# Patient Record
Sex: FEMALE | Race: WHITE | NOT HISPANIC OR LATINO | Employment: UNEMPLOYED | URBAN - METROPOLITAN AREA
[De-identification: names, ages, dates, MRNs, and addresses within clinical notes are randomized per-mention and may not be internally consistent; named-entity substitution may affect disease eponyms.]

---

## 2024-01-28 ENCOUNTER — OFFICE VISIT (OUTPATIENT)
Dept: URGENT CARE | Facility: CLINIC | Age: 64
End: 2024-01-28
Payer: COMMERCIAL

## 2024-01-28 ENCOUNTER — HOSPITAL ENCOUNTER (EMERGENCY)
Facility: HOSPITAL | Age: 64
Discharge: HOME | End: 2024-01-28
Attending: STUDENT IN AN ORGANIZED HEALTH CARE EDUCATION/TRAINING PROGRAM
Payer: COMMERCIAL

## 2024-01-28 ENCOUNTER — APPOINTMENT (EMERGENCY)
Dept: CT IMAGING | Facility: HOSPITAL | Age: 64
End: 2024-01-28
Payer: COMMERCIAL

## 2024-01-28 ENCOUNTER — APPOINTMENT (OUTPATIENT)
Dept: RADIOLOGY | Facility: CLINIC | Age: 64
End: 2024-01-28
Payer: COMMERCIAL

## 2024-01-28 VITALS
RESPIRATION RATE: 20 BRPM | HEIGHT: 67 IN | HEART RATE: 85 BPM | BODY MASS INDEX: 24.96 KG/M2 | WEIGHT: 159 LBS | SYSTOLIC BLOOD PRESSURE: 171 MMHG | DIASTOLIC BLOOD PRESSURE: 77 MMHG | OXYGEN SATURATION: 97 % | TEMPERATURE: 98 F

## 2024-01-28 VITALS
OXYGEN SATURATION: 99 % | HEART RATE: 77 BPM | DIASTOLIC BLOOD PRESSURE: 70 MMHG | SYSTOLIC BLOOD PRESSURE: 166 MMHG | RESPIRATION RATE: 18 BRPM | TEMPERATURE: 97.9 F
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
## 2024-01-28 DIAGNOSIS — J32.9 SINUSITIS: ICD-10-CM

## 2024-01-28 DIAGNOSIS — Z23 ENCOUNTER FOR IMMUNIZATION: ICD-10-CM

## 2024-01-28 DIAGNOSIS — R07.89 CHEST WALL PAIN: ICD-10-CM

## 2024-01-28 DIAGNOSIS — E83.42 HYPOMAGNESEMIA: ICD-10-CM

## 2024-01-28 DIAGNOSIS — S00.81XA ABRASION OF FACE, INITIAL ENCOUNTER: ICD-10-CM

## 2024-01-28 DIAGNOSIS — R11.0 NAUSEA: ICD-10-CM

## 2024-01-28 DIAGNOSIS — S63.502A SPRAIN OF LEFT WRIST, INITIAL ENCOUNTER: ICD-10-CM
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
## 2024-01-28 DIAGNOSIS — M54.9 BACK PAIN: ICD-10-CM

## 2024-01-28 LAB
2HR DELTA HS TROPONIN: 3 NG/L
ALBUMIN SERPL BCP-MCNC: 4.4 G/DL (ref 3.5–5)
ALP SERPL-CCNC: 91 U/L (ref 34–104)
ALT SERPL W P-5'-P-CCNC: 28 U/L (ref 7–52)
ANION GAP SERPL CALCULATED.3IONS-SCNC: 8 MMOL/L
AST SERPL W P-5'-P-CCNC: 28 U/L (ref 13–39)
BACTERIA UR QL AUTO: NORMAL /HPF
BASOPHILS # BLD AUTO: 0.03 THOUSANDS/ÂΜL (ref 0–0.1)
BASOPHILS NFR BLD AUTO: 0 % (ref 0–1)
BILIRUB SERPL-MCNC: 0.72 MG/DL (ref 0.2–1)
BILIRUB UR QL STRIP: NEGATIVE
BUN SERPL-MCNC: 8 MG/DL (ref 5–25)
CALCIUM SERPL-MCNC: 9.7 MG/DL (ref 8.4–10.2)
CARDIAC TROPONIN I PNL SERPL HS: 11 NG/L
CARDIAC TROPONIN I PNL SERPL HS: 8 NG/L
CHLORIDE SERPL-SCNC: 101 MMOL/L (ref 96–108)
CLARITY UR: CLEAR
CO2 SERPL-SCNC: 28 MMOL/L (ref 21–32)
COLOR UR: YELLOW
CREAT SERPL-MCNC: 0.46 MG/DL (ref 0.6–1.3)
EOSINOPHIL # BLD AUTO: 0.07 THOUSAND/ÂΜL (ref 0–0.61)
EOSINOPHIL NFR BLD AUTO: 1 % (ref 0–6)
ERYTHROCYTE [DISTWIDTH] IN BLOOD BY AUTOMATED COUNT: 13.2 % (ref 11.6–15.1)
FLUAV RNA RESP QL NAA+PROBE: NEGATIVE
FLUBV RNA RESP QL NAA+PROBE: NEGATIVE
GFR SERPL CREATININE-BSD FRML MDRD: 105 ML/MIN/1.73SQ M
GLUCOSE SERPL-MCNC: 161 MG/DL (ref 65–140)
GLUCOSE UR STRIP-MCNC: NEGATIVE MG/DL
HCT VFR BLD AUTO: 39.4 % (ref 34.8–46.1)
HGB BLD-MCNC: 13.5 G/DL (ref 11.5–15.4)
HGB UR QL STRIP.AUTO: ABNORMAL
IMM GRANULOCYTES # BLD AUTO: 0.1 THOUSAND/UL (ref 0–0.2)
IMM GRANULOCYTES NFR BLD AUTO: 1 % (ref 0–2)
KETONES UR STRIP-MCNC: NEGATIVE MG/DL
LACTATE SERPL-SCNC: 1.7 MMOL/L (ref 0.5–2)
LEUKOCYTE ESTERASE UR QL STRIP: NEGATIVE
LIPASE SERPL-CCNC: 25 U/L (ref 11–82)
LYMPHOCYTES # BLD AUTO: 1.57 THOUSANDS/ÂΜL (ref 0.6–4.47)
LYMPHOCYTES NFR BLD AUTO: 11 % (ref 14–44)
MAGNESIUM SERPL-MCNC: 1.7 MG/DL (ref 1.9–2.7)
MCH RBC QN AUTO: 29.7 PG (ref 26.8–34.3)
MCHC RBC AUTO-ENTMCNC: 34.3 G/DL (ref 31.4–37.4)
MCV RBC AUTO: 87 FL (ref 82–98)
MONOCYTES # BLD AUTO: 0.62 THOUSAND/ÂΜL (ref 0.17–1.22)
MONOCYTES NFR BLD AUTO: 4 % (ref 4–12)
NEUTROPHILS # BLD AUTO: 12.23 THOUSANDS/ÂΜL (ref 1.85–7.62)
NEUTS SEG NFR BLD AUTO: 83 % (ref 43–75)
NITRITE UR QL STRIP: NEGATIVE
NON-SQ EPI CELLS URNS QL MICRO: NORMAL /HPF
NRBC BLD AUTO-RTO: 0 /100 WBCS
PH UR STRIP.AUTO: 7 [PH]
PLATELET # BLD AUTO: 289 THOUSANDS/UL (ref 149–390)
PMV BLD AUTO: 8.9 FL (ref 8.9–12.7)
POTASSIUM SERPL-SCNC: 4.4 MMOL/L (ref 3.5–5.3)
PROT SERPL-MCNC: 7.2 G/DL (ref 6.4–8.4)
PROT UR STRIP-MCNC: NEGATIVE MG/DL
RBC # BLD AUTO: 4.54 MILLION/UL (ref 3.81–5.12)
RBC #/AREA URNS AUTO: NORMAL /HPF
RSV RNA RESP QL NAA+PROBE: NEGATIVE
SARS-COV-2 RNA RESP QL NAA+PROBE: NEGATIVE
SODIUM SERPL-SCNC: 137 MMOL/L (ref 135–147)
SP GR UR STRIP.AUTO: <=1.005 (ref 1–1.03)
UROBILINOGEN UR QL STRIP.AUTO: 0.2 E.U./DL
WBC # BLD AUTO: 14.62 THOUSAND/UL (ref 4.31–10.16)
WBC #/AREA URNS AUTO: NORMAL /HPF

## 2024-01-28 PROCEDURE — 84484 ASSAY OF TROPONIN QUANT: CPT | Performed by: PHYSICIAN ASSISTANT

## 2024-01-28 PROCEDURE — 99285 EMERGENCY DEPT VISIT HI MDM: CPT | Performed by: PHYSICIAN ASSISTANT

## 2024-01-28 PROCEDURE — 70486 CT MAXILLOFACIAL W/O DYE: CPT

## 2024-01-28 PROCEDURE — 85025 COMPLETE CBC W/AUTO DIFF WBC: CPT | Performed by: PHYSICIAN ASSISTANT

## 2024-01-28 PROCEDURE — G1004 CDSM NDSC: HCPCS

## 2024-01-28 PROCEDURE — 71101 X-RAY EXAM UNILAT RIBS/CHEST: CPT

## 2024-01-28 PROCEDURE — 83605 ASSAY OF LACTIC ACID: CPT | Performed by: PHYSICIAN ASSISTANT

## 2024-01-28 PROCEDURE — 80053 COMPREHEN METABOLIC PANEL: CPT | Performed by: PHYSICIAN ASSISTANT

## 2024-01-28 PROCEDURE — 70450 CT HEAD/BRAIN W/O DYE: CPT

## 2024-01-28 PROCEDURE — 71260 CT THORAX DX C+: CPT

## 2024-01-28 PROCEDURE — 96361 HYDRATE IV INFUSION ADD-ON: CPT

## 2024-01-28 PROCEDURE — 99213 OFFICE O/P EST LOW 20 MIN: CPT

## 2024-01-28 PROCEDURE — 72125 CT NECK SPINE W/O DYE: CPT

## 2024-01-28 PROCEDURE — 0241U HB NFCT DS VIR RESP RNA 4 TRGT: CPT | Performed by: PHYSICIAN ASSISTANT

## 2024-01-28 PROCEDURE — 90471 IMMUNIZATION ADMIN: CPT

## 2024-01-28 PROCEDURE — 74177 CT ABD & PELVIS W/CONTRAST: CPT

## 2024-01-28 PROCEDURE — 90715 TDAP VACCINE 7 YRS/> IM: CPT

## 2024-01-28 PROCEDURE — 81001 URINALYSIS AUTO W/SCOPE: CPT | Performed by: PHYSICIAN ASSISTANT

## 2024-01-28 PROCEDURE — 96365 THER/PROPH/DIAG IV INF INIT: CPT

## 2024-01-28 PROCEDURE — 87040 BLOOD CULTURE FOR BACTERIA: CPT | Performed by: PHYSICIAN ASSISTANT

## 2024-01-28 PROCEDURE — 70150 X-RAY EXAM OF FACIAL BONES: CPT

## 2024-01-28 PROCEDURE — 99284 EMERGENCY DEPT VISIT MOD MDM: CPT

## 2024-01-28 PROCEDURE — 73110 X-RAY EXAM OF WRIST: CPT

## 2024-01-28 PROCEDURE — 83735 ASSAY OF MAGNESIUM: CPT | Performed by: PHYSICIAN ASSISTANT

## 2024-01-28 PROCEDURE — 36415 COLL VENOUS BLD VENIPUNCTURE: CPT | Performed by: PHYSICIAN ASSISTANT

## 2024-01-28 PROCEDURE — 83690 ASSAY OF LIPASE: CPT | Performed by: PHYSICIAN ASSISTANT

## 2024-01-28 PROCEDURE — 96375 TX/PRO/DX INJ NEW DRUG ADDON: CPT

## 2024-01-28 PROCEDURE — 93005 ELECTROCARDIOGRAM TRACING: CPT

## 2024-01-28 RX ORDER — ATENOLOL 50 MG/1
TABLET ORAL
COMMUNITY
Start: 2023-10-16

## 2024-01-28 RX ORDER — ITRACONAZOLE 100 MG/1
CAPSULE ORAL
COMMUNITY
Start: 2023-09-20

## 2024-01-28 RX ORDER — GLIMEPIRIDE 2 MG/1
2 TABLET ORAL
COMMUNITY
Start: 2023-10-13

## 2024-01-28 RX ORDER — MORPHINE SULFATE 4 MG/ML
4 INJECTION, SOLUTION INTRAMUSCULAR; INTRAVENOUS ONCE
Status: COMPLETED | OUTPATIENT
Start: 2024-01-28 | End: 2024-01-28

## 2024-01-28 RX ORDER — MONTELUKAST SODIUM 4 MG/1
TABLET, CHEWABLE ORAL
COMMUNITY
Start: 2023-11-17

## 2024-01-28 RX ORDER — LATANOPROST 50 UG/ML
SOLUTION/ DROPS OPHTHALMIC
COMMUNITY
Start: 2023-11-22

## 2024-01-28 RX ORDER — UREA 10 %
500 LOTION (ML) TOPICAL 2 TIMES DAILY
Qty: 14 TABLET | Refills: 0 | Status: SHIPPED | OUTPATIENT
Start: 2024-01-28 | End: 2024-02-06

## 2024-01-28 RX ORDER — ONDANSETRON 4 MG/1
4 TABLET, ORALLY DISINTEGRATING ORAL ONCE
Status: COMPLETED | OUTPATIENT
Start: 2024-01-28 | End: 2024-01-28

## 2024-01-28 RX ORDER — MELOXICAM 15 MG/1
TABLET ORAL
COMMUNITY

## 2024-01-28 RX ORDER — PAROXETINE HYDROCHLORIDE 40 MG/1
TABLET, FILM COATED ORAL
COMMUNITY

## 2024-01-28 RX ORDER — MAGNESIUM SULFATE 1 G/100ML
1 INJECTION INTRAVENOUS ONCE
Status: COMPLETED | OUTPATIENT
Start: 2024-01-28 | End: 2024-01-28

## 2024-01-28 RX ORDER — LEVOTHYROXINE SODIUM 175 MCG
TABLET ORAL
COMMUNITY
Start: 2023-09-18

## 2024-01-28 RX ORDER — PIOGLITAZONEHYDROCHLORIDE 15 MG/1
15 TABLET ORAL DAILY
COMMUNITY

## 2024-01-28 RX ORDER — AMOXICILLIN AND CLAVULANATE POTASSIUM 875; 125 MG/1; MG/1
1 TABLET, FILM COATED ORAL ONCE
Status: COMPLETED | OUTPATIENT
Start: 2024-01-28 | End: 2024-01-28

## 2024-01-28 RX ORDER — KETOCONAZOLE 20 MG/G
1 CREAM TOPICAL DAILY
COMMUNITY
Start: 2023-09-20 | End: 2024-09-19

## 2024-01-28 RX ORDER — ESTRADIOL 10 UG/1
INSERT VAGINAL
COMMUNITY
Start: 2023-10-02

## 2024-01-28 RX ORDER — AMOXICILLIN AND CLAVULANATE POTASSIUM 875; 125 MG/1; MG/1
1 TABLET, FILM COATED ORAL EVERY 12 HOURS
Qty: 14 TABLET | Refills: 0 | Status: SHIPPED | OUTPATIENT
Start: 2024-01-28 | End: 2024-02-04

## 2024-01-28 RX ORDER — METHOCARBAMOL 500 MG/1
TABLET, FILM COATED ORAL
COMMUNITY
Start: 2024-01-23

## 2024-01-28 RX ORDER — GLIMEPIRIDE 2 MG/1
1 TABLET ORAL DAILY
COMMUNITY

## 2024-01-28 RX ORDER — EPINEPHRINE 0.3 MG/.3ML
0.3 INJECTION SUBCUTANEOUS
COMMUNITY

## 2024-01-28 RX ORDER — GABAPENTIN 100 MG/1
CAPSULE ORAL
COMMUNITY
Start: 2024-01-23

## 2024-01-28 RX ORDER — AMLODIPINE BESYLATE 5 MG/1
TABLET ORAL
COMMUNITY
Start: 2023-10-16

## 2024-01-28 RX ORDER — ONDANSETRON 4 MG/1
4 TABLET, ORALLY DISINTEGRATING ORAL EVERY 6 HOURS PRN
Qty: 20 TABLET | Refills: 0 | Status: SHIPPED | OUTPATIENT
Start: 2024-01-28

## 2024-01-28 RX ADMIN — ONDANSETRON 4 MG: 4 TABLET, ORALLY DISINTEGRATING ORAL at 14:15

## 2024-01-28 RX ADMIN — MORPHINE SULFATE 4 MG: 4 INJECTION INTRAVENOUS at 15:47

## 2024-01-28 RX ADMIN — IOHEXOL 100 ML: 350 INJECTION, SOLUTION INTRAVENOUS at 16:52

## 2024-01-28 RX ADMIN — SODIUM CHLORIDE 500 ML: 0.9 INJECTION, SOLUTION INTRAVENOUS at 15:45

## 2024-01-28 RX ADMIN — AMOXICILLIN AND CLAVULANATE POTASSIUM 1 TABLET: 875; 125 TABLET, FILM COATED ORAL at 18:26

## 2024-01-28 RX ADMIN — MAGNESIUM SULFATE HEPTAHYDRATE 1 G: 1 INJECTION, SOLUTION INTRAVENOUS at 17:02

## 2024-01-28 NOTE — PROGRESS NOTES
[facility]        NAME: Maria Guadalupe Oconnor is a 63 y.o. female  : 1960    MRN: [id number]  DATE: 2024  TIME: 2:44 PM    Assessment and Plan   Fall, initial encounter [W19.XXXA]  1. Fall, initial encounter  XR wrist 3+ vw left    XR ribs left w pa chest min 3 views    XR facial bones 3+ vw      2. Encounter for immunization  Tdap Vaccine greater than or equal to 8yo      3. Nausea  ondansetron (ZOFRAN-ODT) dispersible tablet 4 mg        Patient became nauseous in CAT scan. She was given an ODT zofran and sent to the ER for further evaluation   X-rays interpreted by myself. No acute osseous abnormality. Pending radiology review.   Patient Instructions   Go to the ER for further evaluation    Follow up with PCP in 3-5 days.  Proceed to  ER if symptoms worsen.    Chief Complaint     Chief Complaint   Patient presents with    Fall     Fell in parking lot today at 12:00 pm. Caught self with hands and having left wrist pain and left rib pain. Has scuff on chin and lips and having pain in left front tooth. Has scuffs on both hands. Rib pain is radiating into spine. Went home first and laid down and had difficulty getting up due to rib pain.   Has hx of falls. States health has been declining.          History of Present Illness       Patient is a 63YOF presenting for evaluation after a fall in the parking lot at the grocery store. She states it happened around 12pm today. She states her cart was starting to get away from her and she tried to run after it but fell. She complains of pain in her left wrist, left side, and chin. She denies any LOC but states she was disoriented and saw stars after she fell. Several people helped her up from the ground and her friend drove her home. She states when she got home she cleaned out her cuts and laid down for a little while. Upon arrival she denies any LOC, n/v chest pain or shortness of breath.     Fall  Associated symptoms include nausea. Pertinent  negatives include no fever, headaches or vomiting.       Review of Systems   Review of Systems   Constitutional:  Negative for activity change, appetite change, chills and fever.   Respiratory:  Negative for cough, chest tightness and shortness of breath.    Cardiovascular:  Negative for chest pain.   Gastrointestinal:  Positive for nausea. Negative for vomiting.   Musculoskeletal:  Positive for arthralgias and joint swelling.   Skin:  Positive for wound.   Neurological:  Positive for weakness and light-headedness. Negative for dizziness, syncope and headaches.   All other systems reviewed and are negative.        Current Medications       Current Outpatient Medications:     Alpha-Lipoic Acid 100 MG CAPS, Take 1 capsule by mouth 2 (two) times a day, Disp: , Rfl:     amLODIPine (NORVASC) 5 mg tablet, , Disp: , Rfl:     atenolol (TENORMIN) 50 mg tablet, , Disp: , Rfl:     latanoprost (XALATAN) 0.005 % ophthalmic solution, LOCATION: BOTH EYES. INSTILL 1 DROP BOTH EYES AT BEDTIME, Disp: , Rfl:     metFORMIN (GLUCOPHAGE) 1000 MG tablet, Take 1,000 mg by mouth 2 (two) times a day with meals, Disp: , Rfl:     methocarbamol (ROBAXIN) 500 mg tablet, , Disp: , Rfl:     Synthroid 175 MCG tablet, , Disp: , Rfl:     colestipol (COLESTID) 1 g tablet, 2 (TWO) TABLET 3 TIMES A DAY (Patient not taking: Reported on 1/28/2024), Disp: , Rfl:     EPINEPHrine (EpiPen 2-Shaun) 0.3 mg/0.3 mL SOAJ, Inject 0.3 mg into a muscle (Patient not taking: Reported on 1/28/2024), Disp: , Rfl:     estradiol (Vagifem) 10 MCG TABS vaginal tablet, Start: 10/02/23 10:40:00 EDT, See Instructions, Disp# 60 tab, 1 tab vaginal qhs for first  2 weeks, then decrease to one tablet vaginal twice per week thereafter.  Do not take orally, Pharmacy: [facility]/pharmacy #[id number] (Patient not taking: Reported on 1/28/2024), Disp: , Rfl:     gabapentin (NEURONTIN) 100 mg capsule, , Disp: , Rfl:     glimepiride (AMARYL) 2 mg tablet, Take 1 tablet by mouth daily (Patient not taking:  Reported on 1/28/2024), Disp: , Rfl:     glimepiride (AMARYL) 2 mg tablet, 2 mg (Patient not taking: Reported on 1/28/2024), Disp: , Rfl:     itraconazole (SPORANOX) 100 mg capsule, Take 200 mg by mouth daily (Patient not taking: Reported on 1/28/2024), Disp: , Rfl:     ketoconazole (NIZORAL) 2 % cream, Apply 1 Application topically daily (Patient not taking: Reported on 1/28/2024), Disp: , Rfl:     meloxicam (MOBIC) 15 mg tablet, meloxicam 15 mg tablet  PRN (Patient not taking: Reported on 1/28/2024), Disp: , Rfl:     metFORMIN (GLUCOPHAGE) 500 mg tablet, , Disp: , Rfl:     PARoxetine (PAXIL) 40 MG tablet, , Disp: , Rfl:     pioglitazone (ACTOS) 15 mg tablet, Take 15 mg by mouth daily (Patient not taking: Reported on 1/28/2024), Disp: , Rfl:   No current facility-administered medications for this visit.    Current Allergies     Allergies as of 01/28/2024 - Reviewed 01/28/2024   Allergen Reaction Noted    Indomethacin Anaphylaxis and Seizures 09/17/2013    Latex Itching, Rash, and Anaphylaxis 09/17/2013    Pollen extract Anaphylaxis 12/06/2023    Fluconazole Dizziness 07/27/2021    Lansoprazole Hives 09/17/2013    Ramipril Cough and Swelling 09/17/2013    Sulfa antibiotics GI Intolerance 01/28/2024            The following portions of the patient's history were reviewed and updated as appropriate: allergies, current medications, past family history, past medical history, past social history, past surgical history and problem list.     Past Medical History:   Diagnosis Date    Arthritis     Chronic diarrhea     Diabetes mellitus (HCC)     Hypertension     Hypothyroidism     IBS (irritable bowel syndrome)     Inner ear disease     Multiple cysts of breast        Past Surgical History:   Procedure Laterality Date    BREAST SURGERY Right     3 cysts removed    CERVICAL DISC SURGERY      C7    HYSTERECTOMY      OVARIAN CYST REMOVAL      WISDOM TOOTH EXTRACTION Bilateral        Family History   Problem Relation Age of  Onset    Hypertension Mother     Multiple sclerosis Mother     Rheum arthritis Mother     Cancer Father          Medications have been verified.        Objective   /70   Pulse 77   Temp 97.9 °F (36.6 °C)   Resp 18   SpO2 99%        Physical Exam     Physical Exam  Vitals and nursing note reviewed.   Constitutional:       General: She is not in acute distress.     Appearance: Normal appearance. She is normal weight. She is not ill-appearing or toxic-appearing.   HENT:      Head:     Cardiovascular:      Rate and Rhythm: Normal rate and regular rhythm.      Pulses: Normal pulses.      Heart sounds: Normal heart sounds.   Pulmonary:      Effort: Pulmonary effort is normal.      Breath sounds: Normal breath sounds.   Chest:       Musculoskeletal:      Left wrist: Tenderness and bony tenderness (over the distal ulna) present. Normal range of motion.        Arms:    Skin:     Capillary Refill: Capillary refill takes less than 2 seconds.   Neurological:      General: No focal deficit present.      Mental Status: She is alert and oriented to person, place, and time. Mental status is at baseline.      Cranial Nerves: No cranial nerve deficit.      Motor: No weakness.      Gait: Gait normal.

## 2024-01-28 NOTE — ED PROVIDER NOTES
History  Chief Complaint   Patient presents with    Fall    Rib Pain     L rib cage pain; seen at urgent care; fell outside of grocery store + head strike - thinners - loc     The patient is a 63-year-old female who presents with a complaint of fall.  She states that around noon today she was in a parking lot at a grocery store and lightheaded causing her to lose her balance and fell forward truck her face off of the pavement. She did not have LOC.  Teri other people helped her stand and get up from the fall.  She proceeded to drive home.  She states that when she got home and she was unloading groceries she still felt lightheaded and like she was going to pass out therefore she came in for evaluation.  She went to an urgent care prior to arrival and then came here by ambulance.  The patient states that she felt nauseated but had no vomiting.  She has a slight headache and facial pain due to the fall, left wrist pain which was evaluated at urgent care, low back pain, left rib pain.  Patient states that she does have chronic diarrhea which she has been following with her PCP and GI.  States she lost approximately 40 pounds in 1 year. She denies any fevers, chills, vomiting, abdominal pain.  He also notes to have low back pain which has been worsening over the last few weeks and it is more right-sided and radiates into her right hip.  She was prescribed Mobic and Robaxinfor this and has not started this medication yet.  Denies any lower leg weakness numbness tingling, bowel or bladder incontinence.      Fall  Mechanism of injury: fall    Injury location:  Face and torso  Facial injury location:  Face  Torso injury location:  L chest and back  Incident location:  Outdoors  Time since incident:  3 hours  Arrived directly from scene: no    Fall:     Fall occurred:  Walking and tripped    Impact surface:  Gravel  Suspicion of alcohol use: no    Suspicion of drug use: no    Tetanus status:  Up to date  Prior to arrival  data:     Patient ambulatory at scene: yes      Blood loss:  Minimal    Loss of consciousness: no      Amnesic to event: no    Associated symptoms: back pain    Associated symptoms: no abdominal pain, no chest pain, no difficulty breathing, no hearing loss, no loss of consciousness and no nausea        Prior to Admission Medications   Prescriptions Last Dose Informant Patient Reported? Taking?   Alpha-Lipoic Acid 100 MG CAPS   Yes No   Sig: Take 1 capsule by mouth 2 (two) times a day   EPINEPHrine (EpiPen 2-Shaun) 0.3 mg/0.3 mL SOAJ   Yes No   Sig: Inject 0.3 mg into a muscle   Patient not taking: Reported on 2024   PARoxetine (PAXIL) 40 MG tablet   Yes No   Patient not taking: Reported on 2024   Synthroid 175 MCG tablet   Yes No   amLODIPine (NORVASC) 5 mg tablet   Yes No   atenolol (TENORMIN) 50 mg tablet   Yes No   colestipol (COLESTID) 1 g tablet   Yes No   Si (TWO) TABLET 3 TIMES A DAY   Patient not taking: Reported on 2024   estradiol (Vagifem) 10 MCG TABS vaginal tablet   Yes No   Sig: Start: 10/02/23 10:40:00 EDT, See Instructions, Disp# 60 tab, 1 tab vaginal qhs for first  2 weeks, then decrease to one tablet vaginal twice per week thereafter.  Do not take orally, Pharmacy: [facility]/pharmacy #[id number]   Patient not taking: Reported on 2024   gabapentin (NEURONTIN) 100 mg capsule   Yes No   Patient not taking: Reported on 2024   glimepiride (AMARYL) 2 mg tablet   Yes No   Sig: Take 1 tablet by mouth daily   Patient not taking: Reported on 2024   glimepiride (AMARYL) 2 mg tablet   Yes No   Si mg   Patient not taking: Reported on 2024   itraconazole (SPORANOX) 100 mg capsule   Yes No   Sig: Take 200 mg by mouth daily   Patient not taking: Reported on 2024   ketoconazole (NIZORAL) 2 % cream   Yes No   Sig: Apply 1 Application topically daily   Patient not taking: Reported on 2024   latanoprost (XALATAN) 0.005 % ophthalmic solution   Yes No   Sig: LOCATION: BOTH EYES.  INSTILL 1 DROP BOTH EYES AT BEDTIME   meloxicam (MOBIC) 15 mg tablet   Yes No   Sig: meloxicam 15 mg tablet   PRN   Patient not taking: Reported on 1/28/2024   metFORMIN (GLUCOPHAGE) 1000 MG tablet   Yes No   Sig: Take 1,000 mg by mouth 2 (two) times a day with meals   metFORMIN (GLUCOPHAGE) 500 mg tablet   Yes No   Patient not taking: Reported on 1/28/2024   methocarbamol (ROBAXIN) 500 mg tablet   Yes No   pioglitazone (ACTOS) 15 mg tablet   Yes No   Sig: Take 15 mg by mouth daily   Patient not taking: Reported on 1/28/2024      Facility-Administered Medications Last Administration Doses Remaining   ondansetron (ZOFRAN-ODT) dispersible tablet 4 mg 1/28/2024  2:15 PM 0          Past Medical History:   Diagnosis Date    Arthritis     Chronic diarrhea     Diabetes mellitus (HCC)     Hypertension     Hypothyroidism     IBS (irritable bowel syndrome)     Inner ear disease     Multiple cysts of breast        Past Surgical History:   Procedure Laterality Date    BREAST SURGERY Right     3 cysts removed    CERVICAL DISC SURGERY      C7    HYSTERECTOMY      OVARIAN CYST REMOVAL      WISDOM TOOTH EXTRACTION Bilateral        Family History   Problem Relation Age of Onset    Hypertension Mother     Multiple sclerosis Mother     Rheum arthritis Mother     Cancer Father      I have reviewed and agree with the history as documented.    E-Cigarette/Vaping    E-Cigarette Use Never User      E-Cigarette/Vaping Substances     Social History     Tobacco Use    Smoking status: Former     Types: Cigarettes    Smokeless tobacco: Never   Vaping Use    Vaping status: Never Used   Substance Use Topics    Alcohol use: Not Currently    Drug use: Never       Review of Systems   HENT:  Negative for hearing loss.    Cardiovascular:  Negative for chest pain.   Gastrointestinal:  Negative for abdominal pain and nausea.   Musculoskeletal:  Positive for back pain.   Neurological:  Negative for loss of consciousness.   All other systems reviewed and  are negative.      Physical Exam  Physical Exam  Vitals and nursing note reviewed.   Constitutional:       General: She is in acute distress.      Appearance: She is well-developed.   HENT:      Head: Normocephalic. Abrasion present.        Right Ear: External ear normal.      Left Ear: External ear normal.   Eyes:      Pupils: Pupils are equal, round, and reactive to light.   Cardiovascular:      Rate and Rhythm: Normal rate and regular rhythm.      Heart sounds: No murmur heard.  Pulmonary:      Effort: Pulmonary effort is normal. No respiratory distress.      Breath sounds: Normal breath sounds. No wheezing.   Chest:      Chest wall: Tenderness present.       Abdominal:      General: Bowel sounds are normal.      Palpations: Abdomen is soft. There is no mass.      Tenderness: There is no abdominal tenderness. There is no rebound.      Hernia: No hernia is present.   Musculoskeletal:      Cervical back: Full passive range of motion without pain, normal range of motion and neck supple.      Lumbar back: Tenderness present.   Skin:     General: Skin is warm and dry.      Capillary Refill: Capillary refill takes less than 2 seconds.          Neurological:      Mental Status: She is alert and oriented to person, place, and time.      Coordination: Coordination normal.   Psychiatric:         Behavior: Behavior normal.         Vital Signs  ED Triage Vitals   Temperature Pulse Respirations Blood Pressure SpO2   01/28/24 1458 01/28/24 1458 01/28/24 1458 01/28/24 1458 01/28/24 1458   98 °F (36.7 °C) 75 18 (!) 175/78 98 %      Temp src Heart Rate Source Patient Position - Orthostatic VS BP Location FiO2 (%)   -- 01/28/24 1458 01/28/24 1458 01/28/24 1458 --    Monitor Sitting Right arm       Pain Score       01/28/24 1457       6           Vitals:    01/28/24 1536 01/28/24 1600 01/28/24 1703 01/28/24 1745   BP: 154/66 (!) 176/72 (!) 178/77 156/77   Pulse: 82 80 89 87   Patient Position - Orthostatic VS: Standing for 3  minutes - Orthostatic VS Sitting Lying Lying         Visual Acuity  Visual Acuity      Flowsheet Row Most Recent Value   L Pupil Size (mm) 3   R Pupil Size (mm) 3            ED Medications  Medications   sodium chloride 0.9 % bolus 500 mL (0 mL Intravenous Stopped 1/28/24 1645)   morphine injection 4 mg (4 mg Intravenous Given 1/28/24 1547)   magnesium sulfate IVPB (premix) SOLN 1 g (0 g Intravenous Stopped 1/28/24 1802)   iohexol (OMNIPAQUE) 350 MG/ML injection (MULTI-DOSE) 100 mL (100 mL Intravenous Given 1/28/24 1652)   amoxicillin-clavulanate (AUGMENTIN) 875-125 mg per tablet 1 tablet (1 tablet Oral Given 1/28/24 1826)       Diagnostic Studies  Results Reviewed       Procedure Component Value Units Date/Time    Urine Microscopic [[id number]]  (Normal) Collected: 01/28/24 1807    Lab Status: Final result Specimen: Urine, Clean Catch Updated: 01/28/24 1825     RBC, UA 0-1 /hpf      WBC, UA None Seen /hpf      Epithelial Cells Occasional /hpf      Bacteria, UA None Seen /hpf     UA w Reflex to Microscopic w Reflex to Culture [[id number]]  (Abnormal) Collected: 01/28/24 1807    Lab Status: Final result Specimen: Urine, Clean Catch Updated: 01/28/24 1818     Color, UA Yellow     Clarity, UA Clear     Specific Gravity, UA <=1.005     pH, UA 7.0     Leukocytes, UA Negative     Nitrite, UA Negative     Protein, UA Negative mg/dl      Glucose, UA Negative mg/dl      Ketones, UA Negative mg/dl      Urobilinogen, UA 0.2 E.U./dl      Bilirubin, UA Negative     Occult Blood, UA Trace-Intact    HS Troponin I 2hr [[id number]]  (Normal) Collected: 01/28/24 1743    Lab Status: Final result Specimen: Blood from Arm, Left Updated: 01/28/24 1809     hs TnI 2hr 11 ng/L      Delta 2hr hsTnI 3 ng/L     FLU/RSV/COVID - if FLU/RSV clinically relevant [[id number]]  (Normal) Collected: 01/28/24 1542    Lab Status: Final result Specimen: Nares from Nose Updated: 01/28/24 1637     SARS-CoV-2 Negative     INFLUENZA A PCR Negative     INFLUENZA B  PCR Negative     RSV PCR Negative    Narrative:      FOR PEDIATRIC PATIENTS - copy/paste COVID Guidelines URL to browser: [email or web address]    SARS-CoV-2 assay is a Nucleic Acid Amplification assay intended for the  qualitative detection of nucleic acid from SARS-CoV-2 in nasopharyngeal  swabs. Results are for the presumptive identification of SARS-CoV-2 RNA.    Positive results are indicative of infection with SARS-CoV-2, the virus  causing COVID-19, but do not rule out bacterial infection or co-infection  with other viruses. Laboratories within the [place] and its  territories are required to report all positive results to the appropriate  public health authorities. Negative results do not preclude SARS-CoV-2  infection and should not be used as the sole basis for treatment or other  patient management decisions. Negative results must be combined with  clinical observations, patient history, and epidemiological information.  This test has not been FDA cleared or approved.    This test has been authorized by FDA under an Emergency Use Authorization  (EUA). This test is only authorized for the duration of time the  declaration that circumstances exist justifying the authorization of the  emergency use of an in vitro diagnostic tests for detection of SARS-CoV-2  virus and/or diagnosis of COVID-19 infection under section 564(b)(1) of  the Act, 21 U.S.C. 360bbb-3(b)(1), unless the authorization is terminated  or revoked sooner. The test has been validated but independent review by FDA  and CLIA is pending.    Test performed using [employer] GeneXpert: This RT-PCR assay targets N2,  a region unique to SARS-CoV-2. A conserved region in the E-gene was chosen  for pan-Sarbecovirus detection which includes SARS-CoV-2.    According to CMS-2020-01-R, this platform meets the definition of high-throughput technology.    HS Troponin 0hr (reflex protocol) [[id number]]   (Normal) Collected: 01/28/24 1542    Lab Status: Final result Specimen: Blood from Arm, Left Updated: 01/28/24 1619     hs TnI 0hr 8 ng/L     Magnesium [[id number]]  (Abnormal) Collected: 01/28/24 1542    Lab Status: Final result Specimen: Blood from Arm, Left Updated: 01/28/24 1615     Magnesium 1.7 mg/dL     Comprehensive metabolic panel [[id number]]  (Abnormal) Collected: 01/28/24 1542    Lab Status: Final result Specimen: Blood from Arm, Left Updated: 01/28/24 1615     Sodium 137 mmol/L      Potassium 4.4 mmol/L      Chloride 101 mmol/L      CO2 28 mmol/L      ANION GAP 8 mmol/L      BUN 8 mg/dL      Creatinine 0.46 mg/dL      Glucose 161 mg/dL      Calcium 9.7 mg/dL      AST 28 U/L      ALT 28 U/L      Alkaline Phosphatase 91 U/L      Total Protein 7.2 g/dL      Albumin 4.4 g/dL      Total Bilirubin 0.72 mg/dL      eGFR 105 ml/min/1.73sq m     Narrative:      National Kidney Disease Foundation guidelines for Chronic Kidney Disease (CKD):     Stage 1 with normal or high GFR (GFR > 90 mL/min/1.73 square meters)    Stage 2 Mild CKD (GFR = 60-89 mL/min/1.73 square meters)    Stage 3A Moderate CKD (GFR = 45-59 mL/min/1.73 square meters)    Stage 3B Moderate CKD (GFR = 30-44 mL/min/1.73 square meters)    Stage 4 Severe CKD (GFR = 15-29 mL/min/1.73 square meters)    Stage 5 End Stage CKD (GFR <15 mL/min/1.73 square meters)  Note: GFR calculation is accurate only with a steady state creatinine    Lipase [[id number]]  (Normal) Collected: 01/28/24 1542    Lab Status: Final result Specimen: Blood from Arm, Left Updated: 01/28/24 1615     Lipase 25 u/L     Lactic acid, plasma (w/reflex if result > 2.0) [[id number]]  (Normal) Collected: 01/28/24 1542    Lab Status: Final result Specimen: Blood from Arm, Left Updated: 01/28/24 1614     LACTIC ACID 1.7 mmol/L     Narrative:      Result may be elevated if tourniquet was used during collection.    CBC and differential [[id number]]  (Abnormal) Collected: 01/28/24 9408    Lab Status:  Final result Specimen: Blood from Arm, Left Updated: 01/28/24 1557     WBC 14.62 Thousand/uL      RBC 4.54 Million/uL      Hemoglobin 13.5 g/dL      Hematocrit 39.4 %      MCV 87 fL      MCH 29.7 pg      MCHC 34.3 g/dL      RDW 13.2 %      MPV 8.9 fL      Platelets 289 Thousands/uL      nRBC 0 /100 WBCs      Neutrophils Relative 83 %      Immat GRANS % 1 %      Lymphocytes Relative 11 %      Monocytes Relative 4 %      Eosinophils Relative 1 %      Basophils Relative 0 %      Neutrophils Absolute 12.23 Thousands/µL      Immature Grans Absolute 0.10 Thousand/uL      Lymphocytes Absolute 1.57 Thousands/µL      Monocytes Absolute 0.62 Thousand/µL      Eosinophils Absolute 0.07 Thousand/µL      Basophils Absolute 0.03 Thousands/µL     Blood culture #2 [[id number]] Collected: 01/28/24 1542    Lab Status: In process Specimen: Blood from Arm, Left Updated: 01/28/24 1554    Blood culture #1 [[id number]] Collected: 01/28/24 1539    Lab Status: In process Specimen: Blood from Arm, Right Updated: 01/28/24 1554                   CT chest abdomen pelvis w contrast   Final Result by Ervin Alonso MD (01/28 1759)      No findings of acute traumatic injury in the chest, abdomen or pelvis.               Workstation performed: [id number]         CT recon only thoracolumbar   Final Result by Lance Gramajo MD (01/28 1802)      No acute osseous abnormality of thoracic or lumbar spine.      Additional chronic/incidental findings as detailed above.   .   Please see same-day CT chest abdomen pelvis with contrast for further evaluation.               Workstation performed: [id number]         CT head without contrast   Final Result by Lance Gramajo MD (01/28 1751)      No acute intracranial abnormality.      Mild chronic microangiopathy.                  Workstation performed: [id number]         CT cervical spine without contrast   Final Result by Lance Gramajo MD (01/28 1755)      No cervical spine fracture or  traumatic malalignment.      Straightened cervical spine, may be due to patient positioning or muscle spasm.      Additional chronic/incidental findings as detailed above.                  Workstation performed: [id number]         CT facial bones without contrast   Final Result by Lance Gramajo MD (01/28 1758)      No acute maxillofacial fracture.               Workstation performed: [id number]                    Procedures  ECG 12 Lead Documentation Only    Date/Time: 1/28/2024 3:56 PM    Performed by: Ozzy Melchor PA-C  Authorized by: Ozzy Melchor PA-C    Indications / Diagnosis:  Nausea, dizziness  ECG reviewed by me, the ED Provider: yes    Patient location:  ED  Previous ECG:     Previous ECG:  Unavailable  Interpretation:     Interpretation: non-specific    Rate:     ECG rate:  77  Rhythm:     Rhythm: sinus rhythm    ST segments:     ST segments:  Non-specific           ED Course  ED Course as of 01/28/24 1828   Sun Jan 28, 2024   1605 WBC(!): 14.62   1633 MAGNESIUM(!): 1.7   1634 Patient doing well at this time.             HEART Risk Score      Flowsheet Row Most Recent Value   Heart Score Risk Calculator    History 0 Filed at: 01/28/2024 1826   ECG 0 Filed at: 01/28/2024 1826   Age 1 Filed at: 01/28/2024 1826   Risk Factors 2 Filed at: 01/28/2024 1826   Troponin 0 Filed at: 01/28/2024 1826   HEART Score 3 Filed at: 01/28/2024 1826                          SBIRT 20yo+      Flowsheet Row Most Recent Value   Initial Alcohol Screen: US AUDIT-C     1. How often do you have a drink containing alcohol? 0 Filed at: 01/28/2024 1457   2. How many drinks containing alcohol do you have on a typical day you are drinking?  0 Filed at: 01/28/2024 1457   3a. Male UNDER 65: How often do you have five or more drinks on one occasion? 0 Filed at: 01/28/2024 1457   3b. FEMALE Any Age, or MALE 65+: How often do you have 4 or more drinks on one occassion? 0 Filed at: 01/28/2024 1457   Audit-C Score 0 Filed  at: 01/28/2024 1458   JOSE: How many times in the past year have you...    Used an illegal drug or used a prescription medication for non-medical reasons? Never Filed at: 01/28/2024 4428                      Medical Decision Making  The patient is a 63-year-old female who presents with a complaint of fall.  She states that around noon today she was in a parking lot at a grocery store and lightheaded causing her to lose her balance and fell forward truck her face off of the pavement. She did not have LOC.  Teri other people helped her stand and get up from the fall.  She proceeded to drive home.  She states that when she got home and she was unloading groceries she still felt lightheaded and like she was going to pass out therefore she came in for evaluation.  She went to an urgent care prior to arrival and then came here by ambulance.  The patient states that she felt nauseated but had no vomiting.  She has a slight headache and facial pain due to the fall, left wrist pain which was evaluated at urgent care, low back pain, left rib pain.  Patient states that she does have chronic diarrhea which she has been following with her PCP and GI.  States she lost approximately 40 pounds in 1 year. She denies any fevers, chills, vomiting, abdominal pain.  He also notes to have low back pain which has been worsening over the last few weeks and it is more right-sided and radiates into her right hip.  She was prescribed Mobic and Robaxinfor this and has not started this medication yet.  Denies any lower leg weakness numbness tingling, bowel or bladder incontinence.      Tetanus was given PTA by urgent care     Patient on examination had facial abrasions.  Patient was not found to have any acute traumatic injuries.  Patient did note to have sinusitis on the CAT scan which patient states she is having construction in her house which is causing the dust to aggravate her nose.  Has been having nasal congestion for quite some time.   No fevers chills.  Patient was given a wrist brace for her left wrist for a wrist sprain.  Patient was started on antibiotics for her sinusitis.  Patient's EKG was unremarkable for any acute ischemic findings and troponin x 2 was unremarkable.  Patient was instructed to follow back up with her primary care physician.      Differential diagnosis was included but not limited to: GERD, gastritis, PUD, esophageal spasm, pancreatitis, acute cholecystitis, acute cholangitis, biliary colic, acute cystitis, renal colic, kidney stone, MSK pain, diverticulitis, constipation, proctitis, small bowel obstruction, large bowel obstruction, mass, viral syndrome, intracranial abnormality, fracture, dehydration, UTI, ACS      Amount and/or Complexity of Data Reviewed  Labs: ordered. Decision-making details documented in ED Course.  Radiology: ordered and independent interpretation performed. Decision-making details documented in ED Course.  ECG/medicine tests: ordered and independent interpretation performed. Decision-making details documented in ED Course.    Risk  OTC drugs.  Prescription drug management.             Disposition  Final diagnoses:   Fall, initial encounter   Hypomagnesemia   Abrasion of face, initial encounter   Sinusitis   Back pain   Sprain of left wrist, initial encounter   Chest wall pain     Time reflects when diagnosis was documented in both MDM as applicable and the Disposition within this note       Time User Action Codes Description Comment    1/28/2024  5:55 PM Ozzy Melchor [W19.XXXA] Fall, initial encounter     1/28/2024  5:55 PM Ozzy Melchor [E83.42] Hypomagnesemia     1/28/2024  6:02 PM Ozzy Melchor [S00.81XA] Abrasion of face, initial encounter     1/28/2024  6:02 PM Ozzy Melchor [J32.9] Sinusitis     1/28/2024  6:02 PM Ozzy Melchor [M54.9] Back pain     1/28/2024  6:02 PM Ozzy Melchor [S63.502A] Sprain of left wrist, initial encounter     1/28/2024  6:26 PM Ozzy Melchor  Add [R07.89] Chest wall pain           ED Disposition       ED Disposition   Discharge    Condition   Stable    Date/Time   Sun Jan 28, 2024 1824    Comment   Maria Guadalupe Barbajd discharge to home/self care.                   Follow-up Information    None         Patient's Medications   Discharge Prescriptions    AMOXICILLIN-CLAVULANATE (AUGMENTIN) 875-125 MG PER TABLET    Take 1 tablet by mouth every 12 (twelve) hours for 7 days       Start Date: 1/28/2024 End Date: 2/4/2024       Order Dose: 1 tablet       Quantity: 14 tablet    Refills: 0    MAGNESIUM GLUCONATE (MAGONATE) 500 MG TABLET    Take 1 tablet (500 mg total) by mouth 2 (two) times a day for 7 days       Start Date: 1/28/2024 End Date: 2/4/2024       Order Dose: 500 mg       Quantity: 14 tablet    Refills: 0    ONDANSETRON (ZOFRAN ODT) 4 MG DISINTEGRATING TABLET    Take 1 tablet (4 mg total) by mouth every 6 (six) hours as needed for nausea or vomiting       Start Date: 1/28/2024 End Date: --       Order Dose: 4 mg       Quantity: 20 tablet    Refills: 0       No discharge procedures on file.    PDMP Review       None            ED Provider  Electronically Signed by             Ozzy Melchor PA-C  01/28/24 4510

## 2024-01-29 ENCOUNTER — TELEPHONE (OUTPATIENT)
Dept: URGENT CARE | Facility: CLINIC | Age: 64
End: 2024-01-29

## 2024-01-29 DIAGNOSIS — S62.347A CLOSED NONDISPLACED FRACTURE OF BASE OF FIFTH METACARPAL BONE OF LEFT HAND, INITIAL ENCOUNTER: Primary | ICD-10-CM

## 2024-01-29 LAB
ATRIAL RATE: 77 BPM
P AXIS: 46 DEGREES
PR INTERVAL: 176 MS
QRS AXIS: -15 DEGREES
QRSD INTERVAL: 74 MS
QT INTERVAL: 374 MS
QTC INTERVAL: 423 MS
T WAVE AXIS: 35 DEGREES
VENTRICULAR RATE: 77 BPM

## 2024-01-29 PROCEDURE — 93010 ELECTROCARDIOGRAM REPORT: CPT | Performed by: INTERNAL MEDICINE

## 2024-01-29 NOTE — PROGRESS NOTES
Splint application    Date/Time: 1/29/2024 1:29 PM    Performed by: Rosalio Sanabria PA-C  Authorized by: Rosalio Sanabria PA-C  Universal Protocol:  Consent: Verbal consent obtained.  Risks and benefits: risks, benefits and alternatives were discussed  Consent given by: patient  Patient understanding: patient states understanding of the procedure being performed  Patient consent: the patient's understanding of the procedure matches consent given  Patient identity confirmed: verbally with patient    Pre-procedure details:     Sensation:  Normal  Procedure details:     Laterality:  Left    Location:  Hand    Splint type:  Ulnar gutter (pre made)  Post-procedure details:     Pain:  Unchanged    Sensation:  Normal    Patient tolerance of procedure:  Tolerated well, no immediate complications

## 2024-01-29 NOTE — TELEPHONE ENCOUNTER
"Spoke with patient regarding radiology findings of left wrist x-ray.  Per radiology,    \"Nondisplaced fracture of the base of the fifth metacarpal bone\"    Instructed patient to return to the care now for splint placement.  Will place an ulnar gutter brace and referred to orthopedics.  Patient verbalized understanding.  "

## 2024-01-30 ENCOUNTER — OFFICE VISIT (OUTPATIENT)
Dept: OBGYN CLINIC | Facility: CLINIC | Age: 64
End: 2024-01-30
Payer: COMMERCIAL

## 2024-01-30 VITALS
HEIGHT: 67 IN | BODY MASS INDEX: 24.64 KG/M2 | SYSTOLIC BLOOD PRESSURE: 123 MMHG | HEART RATE: 76 BPM | DIASTOLIC BLOOD PRESSURE: 70 MMHG | WEIGHT: 157 LBS | TEMPERATURE: 97.8 F

## 2024-01-30 DIAGNOSIS — M79.642 PAIN OF LEFT HAND: ICD-10-CM

## 2024-01-30 DIAGNOSIS — S62.347A CLOSED NONDISPLACED FRACTURE OF BASE OF FIFTH METACARPAL BONE OF LEFT HAND, INITIAL ENCOUNTER: Primary | ICD-10-CM
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
## 2024-01-30 PROCEDURE — 99204 OFFICE O/P NEW MOD 45 MIN: CPT | Performed by: STUDENT IN AN ORGANIZED HEALTH CARE EDUCATION/TRAINING PROGRAM

## 2024-01-30 NOTE — PROGRESS NOTES
1. Closed nondisplaced fracture of base of fifth metacarpal bone of left hand, initial encounter  Ambulatory Referral to Orthopedic Surgery      2. Pain of left hand        3. Fall, initial encounter          No orders of the defined types were placed in this encounter.       Imaging Studies (I personally reviewed images in PACS and report):    X-ray left wrist 1/28/2024: Nondisplaced fracture of the base of the fifth metacarpal bone.  No definitive articular extension  CT chest abdomen pelvis without contrast 1/28/2024: No findings of acute traumatic injury in the chest, abdomen, or pelvis.    IMPRESSION:  Left hand pain/injury secondary to FOOSH injury resulting in non-displaced extra-articular base of the fifth metacarpal fracture  Currently in TKO brace  Date of Injury: 1/28/2024  Follow up interval: 2 days    PLAN:  Repeat X-ray next visit:   Left hand (outside of splint)  Clinical exam and radiographic imaging reviewed with patient today, with impression as per above. I have discussed with the patient the pathophysiology of this diagnosis and reviewed how the examination correlates with this diagnosis.    Prior imaging reviewed as noted above.  In regards to her fifth metacarpal fracture, I recommended conservative treatment at this time.  I will maintain her in the TKO brace at all times to maintain stability of fracture.  I counseled the importance of maintaining the splint and that removing the splint may result in further displacement of the fracture and potential surgical intervention.  In regards to pain control, I counseled icing 20 minutes on/off, elevation of the affected extremity, acetaminophen.  Patient avoids oral NSAIDs due to history of IBS.  I will make a close follow-up in 1 week to reevaluate her hand and repeat imaging.  I counseled that she would need at least a minimum of 6 weeks of immobilization either splint/casting of her fracture to allow for bony healing.  Recommended vitamin  "supplemental use of vitamin D 2000 international units daily as well as dietary calcium to facilitate bony healing.    Return in about 1 week (around 2/6/2024) for Follow up left hand fracture.    Portions of the record may have been created with voice recognition software. Occasional wrong word or \"sound a like\" substitutions may have occurred due to the inherent limitations of voice recognition software. Read the chart carefully and recognize, using context, where substitutions have occurred.     CHIEF COMPLAINT:  Chief Complaint   Patient presents with    Left Hand - Pain         HPI:  Maria Guadalupe Oconnor is a 63 y.o. female  who presents for       Visit 1/30/2024:  Initial evaluation of left hand injury/fracture  Precipitating injury on 1/28 from [facility]  Went to ER- imaging done as per above - found to have base of 5th metacarpal frx and placed in a TKO brace which she is maintaining today.  She states that she continues to have swelling of her fingers as well as bruising of her hand.  She states pain is minimal/tolerable of her hand while in the brace.  She states most of her pain is over her left chest wall and is aggravated with range of motion movements of her upper body as well as deep breathing.  In regards to pain control for her hand, she has been applying ice, taking Tylenol with limited relief.  She states that she has not been elevating her hand.  She avoids NSAIDs due to her history of IBS.  Patient denies prior injuries or surgeries of her left hand in the past but has noted a history of left humerus fracture in the past treated conservatively (in 2020 based on chart review).  Patient denies N/T of her left hand.  She feels she has almost full range of motion of the digits of left hand with reported stiffness of her little digit.      Medical, Surgical, Family, and Social History    Past Medical History:   Diagnosis Date    Arthritis     Chronic diarrhea     Diabetes mellitus (HCC)     Hypertension     " "Hypothyroidism     IBS (irritable bowel syndrome)     Inner ear disease     Multiple cysts of breast      Past Surgical History:   Procedure Laterality Date    BREAST SURGERY Right     3 cysts removed    CERVICAL DISC SURGERY      C7    HYSTERECTOMY      OVARIAN CYST REMOVAL      WISDOM TOOTH EXTRACTION Bilateral      Social History   Social History     Substance and Sexual Activity   Alcohol Use Not Currently     Social History     Substance and Sexual Activity   Drug Use Never     Social History     Tobacco Use   Smoking Status Former    Types: Cigarettes   Smokeless Tobacco Never     Family History   Problem Relation Age of Onset    Hypertension Mother     Multiple sclerosis Mother     Rheum arthritis Mother     Cancer Father      Allergies   Allergen Reactions    Chicken Allergy - Food Allergy Anaphylaxis    Indomethacin Anaphylaxis and Seizures     SEIZURES    Latex Itching, Rash and Anaphylaxis    Pollen Extract Anaphylaxis    Fluconazole Dizziness    Lansoprazole Hives    Ramipril Cough and Swelling    Sulfa Antibiotics GI Intolerance          Physical Exam  /70   Pulse 76   Temp 97.8 °F (36.6 °C)   Ht 5' 7\" (1.702 m)   Wt 71.2 kg (157 lb)   BMI 24.59 kg/m²     Constitutional:  see vital signs  Gen: well-developed, normocephalic/atraumatic, well-groomed  Eyes: No inflammation or discharge of conjunctiva or lids; sclera clear   Pulmonary/Chest: Effort normal. No respiratory distress.     Left Hand Exam     Tenderness   Left hand tenderness location: +ular aspect of hand along 5th metacarpal.     Range of Motion   Wrist   Extension:  10   Flexion:  10     Comments:  Healing eschars along palmar aspect of hand and along MCPs. No open wounds/drainage/erythema  Bruising/1+ edema along ulnar aspect of hand  Limited but intact ROM of digits at MCP/PIP/DIP without malrotation      Left Elbow Exam     Tenderness   The patient is experiencing no tenderness.     Range of Motion   Extension:  0   Flexion:  140 "     Other   Erythema: absent              Procedures

## 2024-02-03 LAB
BACTERIA BLD CULT: NORMAL
BACTERIA BLD CULT: NORMAL

## 2024-02-06 ENCOUNTER — OFFICE VISIT (OUTPATIENT)
Dept: OBGYN CLINIC | Facility: CLINIC | Age: 64
End: 2024-02-06
Payer: MEDICARE

## 2024-02-06 ENCOUNTER — HOSPITAL ENCOUNTER (OUTPATIENT)
Dept: RADIOLOGY | Facility: CLINIC | Age: 64
Discharge: HOME | End: 2024-02-06
Payer: MEDICARE

## 2024-02-06 VITALS
WEIGHT: 157 LBS | SYSTOLIC BLOOD PRESSURE: 120 MMHG | TEMPERATURE: 97.6 F | BODY MASS INDEX: 24.64 KG/M2 | HEART RATE: 78 BPM | HEIGHT: 67 IN | DIASTOLIC BLOOD PRESSURE: 70 MMHG

## 2024-02-06 DIAGNOSIS — S62.347D CLOSED NONDISPLACED FRACTURE OF BASE OF FIFTH METACARPAL BONE OF LEFT HAND WITH ROUTINE HEALING, SUBSEQUENT ENCOUNTER: Primary | ICD-10-CM

## 2024-02-06 DIAGNOSIS — M79.642 LEFT HAND PAIN: ICD-10-CM

## 2024-02-06 DIAGNOSIS — S62.347D CLOSED NONDISPLACED FRACTURE OF BASE OF FIFTH METACARPAL BONE OF LEFT HAND WITH ROUTINE HEALING, SUBSEQUENT ENCOUNTER: ICD-10-CM
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
## 2024-02-06 PROCEDURE — 73130 X-RAY EXAM OF HAND: CPT

## 2024-02-06 PROCEDURE — 99213 OFFICE O/P EST LOW 20 MIN: CPT | Performed by: STUDENT IN AN ORGANIZED HEALTH CARE EDUCATION/TRAINING PROGRAM

## 2024-02-06 NOTE — PROGRESS NOTES
1. Closed nondisplaced fracture of base of fifth metacarpal bone of left hand with routine healing, subsequent encounter  XR hand 3+ vw left      2. Left hand pain        3. Fall, initial encounter          Orders Placed This Encounter   Procedures    XR hand 3+ vw left        Imaging Studies (I personally reviewed images in PACS and report):    X-ray left wrist 2/6/2024: Redemonstrates the nondisplaced fracture of the base of the fifth metacarpal shaft.  No intra-articular extension fracture noted.  No interval displacement compared to prior imaging.  X-ray left wrist 1/28/2024: Nondisplaced fracture of the base of the fifth metacarpal bone.  No definitive articular extension  CT chest abdomen pelvis without contrast 1/28/2024: No findings of acute traumatic injury in the chest, abdomen, or pelvis.    IMPRESSION:  Left hand pain/injury secondary to FOOSH injury resulting in non-displaced extra-articular base of the fifth metacarpal fracture  Currently in TKO brace  Date of Injury: 1/28/2024  Follow up interval: 1 week, 2 days    PLAN:  Repeat X-ray next visit:   Left hand (outside of splint)  Clinical exam and radiographic imaging reviewed with patient today, with impression as per above. I have discussed with the patient the pathophysiology of this diagnosis and reviewed how the examination correlates with this diagnosis.    Repeated imaging of left hand today as noted above.  I will follow-up official radiology interpretation.  Reassured patient that fracture appears to be stable at this point she is agreeable to maintain use of TKO brace at all times in order to maintain stability of fracture.  Counseled she can continue use of her thumb, index, middle digit in regards to nonstrenuous activities such as dressing herself, writing, typing, eating but to avoid any strenuous lifting with her left hand even with the brace as it can cause further displacement of fracture and potential surgical intervention.  In  "regards to pain control, I counseled icing 20 minutes on/off, elevation of the affected extremity, acetaminophen.  Patient avoids oral NSAIDs due to history of IBS.  Recommended vitamin supplemental use of vitamin D 2000 international units daily as well as dietary calcium to facilitate bony healing.    Return in about 5 weeks (around 3/12/2024).    Portions of the record may have been created with voice recognition software. Occasional wrong word or \"sound a like\" substitutions may have occurred due to the inherent limitations of voice recognition software. Read the chart carefully and recognize, using context, where substitutions have occurred.     CHIEF COMPLAINT:  Chief Complaint   Patient presents with    Follow-up         HPI:  Maria Guadalupe Oconnor is a 63 y.o. female  who presents for     Visit 2/6/2024:  Follow-up evaluation of left hand injury/fracture of the proximal fifth metacarpal  Patient has been adherent to use of the TKO brace since last visit  Denies any new injuries of her left hand since last visit  Reports swelling is improved but does noticed there is some bruising over the palmar aspect of her hand.  She reports intermittent use of Tylenol as well as use of ice which provides relief.  She states the pain is mild, intermittent while in the TKO brace.  She denies any new injuries of her left hand since last visit.    Medical, Surgical, Family, and Social History    Past Medical History:   Diagnosis Date    Arthritis     Chronic diarrhea     Diabetes mellitus (HCC)     Hypertension     Hypothyroidism     IBS (irritable bowel syndrome)     Inner ear disease     Multiple cysts of breast      Past Surgical History:   Procedure Laterality Date    BREAST SURGERY Right     3 cysts removed    CERVICAL DISC SURGERY      C7    HYSTERECTOMY      OVARIAN CYST REMOVAL      WISDOM TOOTH EXTRACTION Bilateral      Social History   Social History     Substance and Sexual Activity   Alcohol Use Not Currently     Social " "History     Substance and Sexual Activity   Drug Use Never     Social History     Tobacco Use   Smoking Status Former    Types: Cigarettes   Smokeless Tobacco Never     Family History   Problem Relation Age of Onset    Hypertension Mother     Multiple sclerosis Mother     Rheum arthritis Mother     Cancer Father      Allergies   Allergen Reactions    Chicken Allergy - Food Allergy Anaphylaxis    Indomethacin Anaphylaxis and Seizures     SEIZURES    Latex Itching, Rash and Anaphylaxis    Pollen Extract Anaphylaxis    Fluconazole Dizziness    Lansoprazole Hives    Ramipril Cough and Swelling    Sulfa Antibiotics GI Intolerance          Physical Exam  /70   Pulse 78   Temp 97.6 °F (36.4 °C) (Temporal)   Ht 5' 7\" (1.702 m)   Wt 71.2 kg (157 lb)   BMI 24.59 kg/m²     Constitutional:  see vital signs  Gen: well-developed, normocephalic/atraumatic, well-groomed  Eyes: No inflammation or discharge of conjunctiva or lids; sclera clear   Pulmonary/Chest: Effort normal. No respiratory distress.     Left Hand Exam     Tenderness   Left hand tenderness location: +localized to base of fifth metacarpal.     Range of Motion   Wrist   Extension:  10   Flexion:  10     Other   Left wrist pulse absent: 2+ radial.    Comments:  Healing eschars along palmar aspect of hand and along MCPs. No open wounds/drainage/erythema  Scant swelling of hand. Ecchymosis along palmar aspect of hand.  Intact ROM at the MCP/PIP/DIP of the middle/index digits.  Intact opposition of her thumb.  Limited but intact range of motion of the ring and little digit without malrotation      Left Elbow Exam     Tenderness   The patient is experiencing no tenderness.     Range of Motion   Extension:  0   Flexion:  140     Other   Erythema: absent              Procedures        "

## 2024-03-13 ENCOUNTER — OFFICE VISIT (OUTPATIENT)
Dept: OBGYN CLINIC | Facility: CLINIC | Age: 64
End: 2024-03-13
Payer: MEDICARE

## 2024-03-13 ENCOUNTER — HOSPITAL ENCOUNTER (OUTPATIENT)
Dept: RADIOLOGY | Facility: CLINIC | Age: 64
Discharge: HOME | End: 2024-03-13
Payer: COMMERCIAL

## 2024-03-13 VITALS
TEMPERATURE: 97.9 F | DIASTOLIC BLOOD PRESSURE: 68 MMHG | WEIGHT: 157 LBS | HEART RATE: 66 BPM | BODY MASS INDEX: 24.64 KG/M2 | SYSTOLIC BLOOD PRESSURE: 120 MMHG | HEIGHT: 67 IN

## 2024-03-13 DIAGNOSIS — S62.347D CLOSED NONDISPLACED FRACTURE OF BASE OF FIFTH METACARPAL BONE OF LEFT HAND WITH ROUTINE HEALING, SUBSEQUENT ENCOUNTER: Primary | ICD-10-CM

## 2024-03-13 DIAGNOSIS — S62.347A CLOSED NONDISPLACED FRACTURE OF BASE OF FIFTH METACARPAL BONE OF LEFT HAND, INITIAL ENCOUNTER: ICD-10-CM

## 2024-03-13 PROCEDURE — 73130 X-RAY EXAM OF HAND: CPT

## 2024-03-13 PROCEDURE — 99213 OFFICE O/P EST LOW 20 MIN: CPT | Performed by: STUDENT IN AN ORGANIZED HEALTH CARE EDUCATION/TRAINING PROGRAM

## 2024-03-13 RX ORDER — DOXYCYCLINE HYCLATE 100 MG
100 TABLET ORAL 2 TIMES DAILY
COMMUNITY
Start: 2024-03-07

## 2024-03-13 NOTE — PROGRESS NOTES
1. Closed nondisplaced fracture of base of fifth metacarpal bone of left hand with routine healing, subsequent encounter  XR hand 3+ vw left        Orders Placed This Encounter   Procedures    XR hand 3+ vw left        Imaging Studies (I personally reviewed images in PACS and report):    X-ray left hand 3/13/2024: Stable alignment of a healing fracture of the base of the fifth metacarpal.  Otherwise no degenerative changes.  X-ray left wrist 2/6/2024: Redemonstrates the nondisplaced fracture of the base of the fifth metacarpal shaft.  No intra-articular extension fracture noted.  No interval displacement compared to prior imaging.  X-ray left wrist 1/28/2024: Nondisplaced fracture of the base of the fifth metacarpal bone.  No definitive articular extension  CT chest abdomen pelvis without contrast 1/28/2024: No findings of acute traumatic injury in the chest, abdomen, or pelvis.    IMPRESSION:  Left hand pain/injury secondary to FOOSH injury resulting in non-displaced extra-articular base of the fifth metacarpal fracture  Currently in TKO brace  Clinically improved and radiographic healing on imaging; there is expected myofascial stiffness of her hand and wrist likely secondary to use of the TKO brace  Date of Injury: 1/28/2024  Follow up interval: 6 weeks, 3 days    PLAN:    Clinical exam and radiographic imaging reviewed with patient today, with impression as per above. I have discussed with the patient the pathophysiology of this diagnosis and reviewed how the examination correlates with this diagnosis.    Repeated imaging of left hand today as noted above.  I will follow-up official radiology interpretation.  Reassured patient of her clinical improvement on exam today and radiographic appearance of her healing fracture.  Counseled she can discontinue the TKO brace at this time and use her right hand as tolerated.  I did offer therapy to hand PT to help facilitate recovery but patient prefers to do a home  "exercise program for now which was supplemented today.  In regards to pain control counseled use of acetaminophen, NSAIDs, heat/ice therapy 20 minutes on/off.    Return if symptoms worsen or fail to improve.    Portions of the record may have been created with voice recognition software. Occasional wrong word or \"sound a like\" substitutions may have occurred due to the inherent limitations of voice recognition software. Read the chart carefully and recognize, using context, where substitutions have occurred.     CHIEF COMPLAINT:  Chief Complaint   Patient presents with    Follow-up         HPI:  Maria Guadalupe Oconnor is a 64 y.o. female  who presents for     Visit 3/14/2024:  Follow-up evaluation of left base of the fifth metacarpal fracture:  Patient reports improvement since last visit.  She has maintained use of the TKO brace since last visit.  She states she will have intermittent aching pains of her hand with certain range of motion movements of her upper extremity along with tingling sensations.  Otherwise she denies any swelling or discoloration of her hand.  She reports only sparing use of Tylenol.  Denies any new injuries of her left upper extremity since last visit.      Medical, Surgical, Family, and Social History    Past Medical History:   Diagnosis Date    Arthritis     Chronic diarrhea     Diabetes mellitus (HCC)     Hypertension     Hypothyroidism     IBS (irritable bowel syndrome)     Inner ear disease     Multiple cysts of breast      Past Surgical History:   Procedure Laterality Date    BREAST SURGERY Right     3 cysts removed    CERVICAL DISC SURGERY      C7    HYSTERECTOMY      OVARIAN CYST REMOVAL      WISDOM TOOTH EXTRACTION Bilateral      Social History   Social History     Substance and Sexual Activity   Alcohol Use Not Currently     Social History     Substance and Sexual Activity   Drug Use Never     Social History     Tobacco Use   Smoking Status Former    Types: Cigarettes   Smokeless Tobacco " "Never     Family History   Problem Relation Age of Onset    Hypertension Mother     Multiple sclerosis Mother     Rheum arthritis Mother     Cancer Father      Allergies   Allergen Reactions    Chicken Allergy - Food Allergy Anaphylaxis    Indomethacin Anaphylaxis and Seizures     SEIZURES    Latex Itching, Rash and Anaphylaxis    Pollen Extract Anaphylaxis    Fluconazole Dizziness    Lansoprazole Hives    Ramipril Cough and Swelling    Sulfa Antibiotics GI Intolerance          Physical Exam  /68   Pulse 66   Temp 97.9 °F (36.6 °C) (Temporal)   Ht 5' 7\" (1.702 m)   Wt 71.2 kg (157 lb)   BMI 24.59 kg/m²     Constitutional:  see vital signs  Gen: well-developed, normocephalic/atraumatic, well-groomed  Eyes: No inflammation or discharge of conjunctiva or lids; sclera clear   Pulmonary/Chest: Effort normal. No respiratory distress.     Left Hand Exam     Tenderness   Left hand tenderness location: Nontender throughout her hand and specifically nontender along the base of her fifth metacarpal from her known prior fracture.     Range of Motion   Wrist   Extension:  10   Flexion:  10     Muscle Strength   Wrist extension: 4/5   Wrist flexion: 4/5   :  4/5     Other   Erythema: absent  Left wrist pulse absent: 2+ radial.    Comments:  In regards to her pinky and ring digit, she has intact but limited range of motion at the MCP/PIP/DIP due to reported stiffness of these digits.  Other digits have full MCP/PIP/DIP motion without malrotation.  Thumb MCP/DIP intact with opposition  No swelling, bruising, erythema  Sensation intact in radial/median/ulnar distribution        Left Elbow Exam     Tenderness   The patient is experiencing no tenderness.     Range of Motion   Extension:  0   Flexion:  140     Other   Erythema: absent              Procedures        "

## 2024-10-07 ENCOUNTER — OFFICE VISIT (OUTPATIENT)
Dept: URGENT CARE | Facility: CLINIC | Age: 64
End: 2024-10-07
Payer: COMMERCIAL

## 2024-10-07 VITALS
BODY MASS INDEX: 24.41 KG/M2 | HEIGHT: 69 IN | SYSTOLIC BLOOD PRESSURE: 161 MMHG | HEART RATE: 86 BPM | RESPIRATION RATE: 18 BRPM | TEMPERATURE: 97.9 F | WEIGHT: 164.8 LBS | DIASTOLIC BLOOD PRESSURE: 67 MMHG | OXYGEN SATURATION: 97 %

## 2024-10-07 DIAGNOSIS — J01.10 ACUTE NON-RECURRENT FRONTAL SINUSITIS: Primary | ICD-10-CM

## 2024-10-07 PROCEDURE — 99213 OFFICE O/P EST LOW 20 MIN: CPT

## 2024-10-07 PROCEDURE — S9083 URGENT CARE CENTER GLOBAL: HCPCS

## 2024-10-07 RX ORDER — AZITHROMYCIN 250 MG/1
TABLET, FILM COATED ORAL
Qty: 6 TABLET | Refills: 0 | Status: SHIPPED | OUTPATIENT
Start: 2024-10-07 | End: 2024-10-11

## 2024-10-07 RX ORDER — METHOCARBAMOL 750 MG/1
TABLET, FILM COATED ORAL
COMMUNITY
Start: 2024-09-28

## 2024-10-07 NOTE — PATIENT INSTRUCTIONS
Take full course of Azithromycin as prescribed  Eat yogurt with live and active cultures and/or take a probiotic at least 3 hours before or after antibiotic dose. Monitor stool for diarrhea and/or blood. If this occurs, contact primary care doctor ASAP.     Tylenol Sinus over the counter  Warm compresses over sinuses  Steam treatment (practice proper safety precautions when handing hot liquids/steam)  Over the counter saline nasal spray  Follow up with PCP in 3-5 days.  Proceed to  ER if symptoms worsen.    If tests are performed, our office will contact you with results only if changes need to made to the care plan discussed with you at the visit. You can review your full results on [facility].

## 2024-10-07 NOTE — PROGRESS NOTES
[facility]        NAME: Maria Guadalupe Oconnor is a 64 y.o. female  : 1960    MRN: [id number]  DATE: 2024  TIME: 10:52 AM    Assessment and Plan   Acute non-recurrent frontal sinusitis [J01.10]  1. Acute non-recurrent frontal sinusitis  azithromycin (ZITHROMAX) 250 mg tablet            Patient Instructions     Take full course of Azithromycin as prescribed  Eat yogurt with live and active cultures and/or take a probiotic at least 3 hours before or after antibiotic dose. Monitor stool for diarrhea and/or blood. If this occurs, contact primary care doctor ASAP.     Tylenol Sinus over the counter  Warm compresses over sinuses  Steam treatment (practice proper safety precautions when handing hot liquids/steam)  Over the counter saline nasal spray  Follow up with PCP in 3-5 days.  Proceed to  ER if symptoms worsen.    If tests are performed, our office will contact you with results only if changes need to made to the care plan discussed with you at the visit. You can review your full results on [facility].    Chief Complaint     Chief Complaint   Patient presents with    Nasal Congestion     Runny and stuffy nose with sinus pressure since Saturday          History of Present Illness       64 year old female arrives reporting sinus pain and pressure increasing over the past 5 days.  Patient reports history of sinus infections and reports this feels similar and frequently requires antibiotics. Patient denies cough, chest pain, shortness of breath, or abdominal pain.  Patient denies fevers.         Review of Systems   Review of Systems   Constitutional:  Negative for chills, diaphoresis, fatigue and fever.   HENT:  Positive for congestion, sinus pressure and sinus pain. Negative for ear pain and sore throat.    Respiratory: Negative.     Cardiovascular: Negative.    Gastrointestinal: Negative.    Neurological:  Positive for headaches.         Current Medications       Current Outpatient  Medications:     Alpha-Lipoic Acid 100 MG CAPS, Take 1 capsule by mouth 2 (two) times a day, Disp: , Rfl:     amLODIPine (NORVASC) 5 mg tablet, , Disp: , Rfl:     atenolol (TENORMIN) 50 mg tablet, , Disp: , Rfl:     azithromycin (ZITHROMAX) 250 mg tablet, Take 2 tablets today then 1 tablet daily x 4 days, Disp: 6 tablet, Rfl: 0    EPINEPHrine (EpiPen 2-Shaun) 0.3 mg/0.3 mL SOAJ, Inject 0.3 mg into a muscle, Disp: , Rfl:     itraconazole (SPORANOX) 100 mg capsule, , Disp: , Rfl:     latanoprost (XALATAN) 0.005 % ophthalmic solution, LOCATION: BOTH EYES. INSTILL 1 DROP BOTH EYES AT BEDTIME, Disp: , Rfl:     metFORMIN (GLUCOPHAGE) 500 mg tablet, , Disp: , Rfl:     methocarbamol (ROBAXIN) 750 mg tablet, TAKE :2 TAB AS NEED IT UP TO 3 TIMES PER DAY FOR BACK PAIN AS NEEDED, Disp: , Rfl:     Synthroid 175 MCG tablet, , Disp: , Rfl:     colestipol (COLESTID) 1 g tablet, , Disp: , Rfl:     doxycycline hyclate (VIBRA-TABS) 100 mg tablet, Take 100 mg by mouth 2 (two) times a day (Patient not taking: Reported on 10/7/2024), Disp: , Rfl:     estradiol (Vagifem) 10 MCG TABS vaginal tablet, , Disp: , Rfl:     gabapentin (NEURONTIN) 100 mg capsule, , Disp: , Rfl:     glimepiride (AMARYL) 2 mg tablet, Take 1 tablet by mouth daily (Patient not taking: Reported on 10/7/2024), Disp: , Rfl:     glimepiride (AMARYL) 2 mg tablet, 2 mg (Patient not taking: Reported on 10/7/2024), Disp: , Rfl:     ketoconazole (NIZORAL) 2 % cream, Apply 1 Application topically daily (Patient not taking: Reported on 10/7/2024), Disp: , Rfl:     magnesium gluconate (MAGONATE) 500 mg tablet, Take 1 tablet (500 mg total) by mouth 2 (two) times a day for 7 days (Patient not taking: Reported on 10/7/2024), Disp: 14 tablet, Rfl: 0    meloxicam (MOBIC) 15 mg tablet, , Disp: , Rfl:     metFORMIN (GLUCOPHAGE) 1000 MG tablet, Take 1,000 mg by mouth 2 (two) times a day with meals (Patient not taking: Reported on 10/7/2024), Disp: , Rfl:     methocarbamol (ROBAXIN) 500 mg  "tablet, , Disp: , Rfl:     ondansetron (Zofran ODT) 4 mg disintegrating tablet, Take 1 tablet (4 mg total) by mouth every 6 (six) hours as needed for nausea or vomiting (Patient not taking: Reported on 10/7/2024), Disp: 20 tablet, Rfl: 0    PARoxetine (PAXIL) 40 MG tablet, , Disp: , Rfl:     pioglitazone (ACTOS) 15 mg tablet, Take 15 mg by mouth daily (Patient not taking: Reported on 10/7/2024), Disp: , Rfl:     Current Allergies     Allergies as of 10/07/2024 - Reviewed 10/07/2024   Allergen Reaction Noted    Chicken allergy - food allergy Anaphylaxis 01/30/2024    Indomethacin Anaphylaxis and Seizures 09/17/2013    Latex Itching, Rash, and Anaphylaxis 09/17/2013    Pollen extract Anaphylaxis 12/06/2023    Fluconazole Dizziness 07/27/2021    Lansoprazole Hives 09/17/2013    Ramipril Cough and Swelling 09/17/2013            The following portions of the patient's history were reviewed and updated as appropriate: allergies, current medications, past family history, past medical history, past social history, past surgical history and problem list.     Past Medical History:   Diagnosis Date    Arthritis     Chronic diarrhea     Diabetes mellitus (HCC)     Hypertension     Hypothyroidism     IBS (irritable bowel syndrome)     Inner ear disease     Multiple cysts of breast        Past Surgical History:   Procedure Laterality Date    BREAST SURGERY Right     3 cysts removed    CERVICAL DISC SURGERY      C7    HYSTERECTOMY      OVARIAN CYST REMOVAL      WISDOM TOOTH EXTRACTION Bilateral        Family History   Problem Relation Age of Onset    Hypertension Mother     Multiple sclerosis Mother     Rheum arthritis Mother     Cancer Father          Medications have been verified.        Objective   /67   Pulse 86   Temp 97.9 °F (36.6 °C) (Temporal)   Resp 18   Ht 5' 9\" (1.753 m)   Wt 74.8 kg (164 lb 12.8 oz)   SpO2 97%   BMI 24.34 kg/m²        Physical Exam     Physical Exam  Vitals and nursing note reviewed. "   Constitutional:       General: She is not in acute distress.     Appearance: Normal appearance. She is not ill-appearing.   HENT:      Head: Normocephalic.      Right Ear: Tympanic membrane, ear canal and external ear normal.      Left Ear: Tympanic membrane, ear canal and external ear normal.      Nose: Congestion present.      Right Sinus: Maxillary sinus tenderness and frontal sinus tenderness present.      Left Sinus: Maxillary sinus tenderness and frontal sinus tenderness present.      Mouth/Throat:      Mouth: Mucous membranes are moist.   Eyes:      Extraocular Movements: Extraocular movements intact.      Pupils: Pupils are equal, round, and reactive to light.   Cardiovascular:      Rate and Rhythm: Normal rate and regular rhythm.      Pulses: Normal pulses.      Heart sounds: Normal heart sounds.   Pulmonary:      Effort: Pulmonary effort is normal. No respiratory distress.      Breath sounds: Normal breath sounds. No stridor. No wheezing, rhonchi or rales.   Chest:      Chest wall: No tenderness.   Musculoskeletal:         General: Normal range of motion.      Cervical back: Normal range of motion and neck supple.   Lymphadenopathy:      Cervical: Cervical adenopathy present.   Skin:     General: Skin is warm and dry.      Capillary Refill: Capillary refill takes less than 2 seconds.   Neurological:      General: No focal deficit present.      Mental Status: She is alert and oriented to person, place, and time.   Psychiatric:         Mood and Affect: Mood normal.         Behavior: Behavior normal.